# Patient Record
Sex: MALE | Race: WHITE
[De-identification: names, ages, dates, MRNs, and addresses within clinical notes are randomized per-mention and may not be internally consistent; named-entity substitution may affect disease eponyms.]

---

## 2020-11-05 ENCOUNTER — HOSPITAL ENCOUNTER (OUTPATIENT)
Dept: HOSPITAL 96 - M.PUL | Age: 50
End: 2020-11-05
Attending: NURSE PRACTITIONER
Payer: COMMERCIAL

## 2020-11-05 DIAGNOSIS — R05: Primary | ICD-10-CM

## 2020-11-16 NOTE — PF
[address]  [address]                    PULMONARY FUNCTION REPORT     
_______________________________________________________________________________
 
Name:       LAXMI CABRALES              Room:                      REG CLI 
M.SUJIT#:  [id number]      Account #:      [id number]  
Admission:  11/05/20     Attend Phys:    YENNY CORTÉS
Discharge:               Date of Birth:  [birth year]  
         Report #: [id number]
                                                                     [id number]  
_______________________________________________________________________________
THIS REPORT FOR:  //name//                      
 
CC: YENNY WREN
 
DATE OF SERVICE:  11/05/2020
 
 
STUDY:  The FEV1/FVC ratio is normal at 72% with a forced vital capacity normal
at 91% and FEV1 normal at 84%.  The TPE11-57 is also normal at 72%.  After the
administration of a bronchodilator, there is no significant increase in any of
these values.  The patient's post-bronchodilator FEV1 is noted to be 3.60
liters.  The total lung capacity is normal at 100% and residual volume is
normal, but close to the upper limit of normal range at 114%.  The DLCO as
adjusted for hemoglobin is normal at 96%.
 
IMPRESSION:  These are normal pulmonary function tests.  The residual volume is
noted to be within the normal range; however, close to the upper limit of normal
range.  This could be a normal variant or could indicate mild underlying
obstructive lung disease.  Clinical correlation is advised.
 
 
 
 
 
 
 
 
 
 
 
 
 
 
 
 
 
 
 
 
 
 
 
 
 
<ELECTRONICALLY SIGNED>
                                        By:  Leonidas De Jesus MD              
11/16/20     0759
D: 11/16/20 0726_______________________________________
T: 11/16/20 0735MD irene Mercado

## 2021-11-09 ENCOUNTER — HOSPITAL ENCOUNTER (OUTPATIENT)
Dept: HOSPITAL 96 - M.CT | Age: 51
End: 2021-11-09
Attending: NURSE PRACTITIONER
Payer: COMMERCIAL

## 2021-11-09 DIAGNOSIS — Z13.6: Primary | ICD-10-CM

## 2021-12-07 ENCOUNTER — HOSPITAL ENCOUNTER (INPATIENT)
Dept: HOSPITAL 96 - M.ERS | Age: 51
LOS: 9 days | Discharge: HOME HEALTH | DRG: 871 | End: 2021-12-16
Attending: STUDENT IN AN ORGANIZED HEALTH CARE EDUCATION/TRAINING PROGRAM | Admitting: STUDENT IN AN ORGANIZED HEALTH CARE EDUCATION/TRAINING PROGRAM
Payer: COMMERCIAL

## 2021-12-07 VITALS — DIASTOLIC BLOOD PRESSURE: 65 MMHG | SYSTOLIC BLOOD PRESSURE: 115 MMHG

## 2021-12-07 VITALS — DIASTOLIC BLOOD PRESSURE: 81 MMHG | SYSTOLIC BLOOD PRESSURE: 124 MMHG

## 2021-12-07 VITALS — DIASTOLIC BLOOD PRESSURE: 66 MMHG | SYSTOLIC BLOOD PRESSURE: 121 MMHG

## 2021-12-07 VITALS — DIASTOLIC BLOOD PRESSURE: 62 MMHG | SYSTOLIC BLOOD PRESSURE: 110 MMHG

## 2021-12-07 VITALS — WEIGHT: 232 LBS | BODY MASS INDEX: 32.48 KG/M2 | HEIGHT: 71 IN

## 2021-12-07 VITALS — SYSTOLIC BLOOD PRESSURE: 100 MMHG | DIASTOLIC BLOOD PRESSURE: 59 MMHG

## 2021-12-07 DIAGNOSIS — E87.70: ICD-10-CM

## 2021-12-07 DIAGNOSIS — J96.01: ICD-10-CM

## 2021-12-07 DIAGNOSIS — G47.33: ICD-10-CM

## 2021-12-07 DIAGNOSIS — Z86.16: ICD-10-CM

## 2021-12-07 DIAGNOSIS — A41.89: Primary | ICD-10-CM

## 2021-12-07 DIAGNOSIS — E11.65: ICD-10-CM

## 2021-12-07 DIAGNOSIS — J12.82: ICD-10-CM

## 2021-12-07 DIAGNOSIS — F41.9: ICD-10-CM

## 2021-12-07 DIAGNOSIS — E66.9: ICD-10-CM

## 2021-12-07 DIAGNOSIS — U07.1: ICD-10-CM

## 2021-12-07 DIAGNOSIS — Z87.891: ICD-10-CM

## 2021-12-07 LAB
ABSOLUTE BASOPHILS: 0 THOU/UL (ref 0–0.2)
ABSOLUTE EOSINOPHILS: 0 THOU/UL (ref 0–0.7)
ABSOLUTE MONOCYTES: 0.2 THOU/UL (ref 0–1.2)
ALBUMIN SERPL-MCNC: 2.9 G/DL (ref 3.4–5)
ALP SERPL-CCNC: 52 U/L (ref 46–116)
ALT SERPL-CCNC: 60 U/L (ref 30–65)
ANION GAP SERPL CALC-SCNC: 11 MMOL/L (ref 7–16)
AST SERPL-CCNC: 87 U/L (ref 15–37)
BASOPHILS NFR BLD AUTO: 0.3 %
BILIRUB SERPL-MCNC: 0.7 MG/DL
BUN SERPL-MCNC: 17 MG/DL (ref 7–18)
CALCIUM SERPL-MCNC: 8.3 MG/DL (ref 8.5–10.1)
CHLORIDE SERPL-SCNC: 95 MMOL/L (ref 98–107)
CO2 SERPL-SCNC: 25 MMOL/L (ref 21–32)
CREAT SERPL-MCNC: 1.4 MG/DL (ref 0.6–1.3)
EOSINOPHIL NFR BLD: 0 %
GLUCOSE SERPL-MCNC: 156 MG/DL (ref 70–99)
GRANULOCYTES NFR BLD MANUAL: 84.2 %
HCT VFR BLD CALC: 42.1 % (ref 42–52)
HGB BLD-MCNC: 14.3 GM/DL (ref 14–18)
LYMPHOCYTES # BLD: 1.3 THOU/UL (ref 0.8–5.3)
LYMPHOCYTES NFR BLD AUTO: 13.2 %
MCH RBC QN AUTO: 29.1 PG (ref 26–34)
MCHC RBC AUTO-ENTMCNC: 33.9 G/DL (ref 28–37)
MCV RBC: 85.9 FL (ref 80–100)
MONOCYTES NFR BLD: 2.3 %
MPV: 7.9 FL. (ref 7.2–11.1)
NEUTROPHILS # BLD: 8.2 THOU/UL (ref 1.6–8.1)
NT-PRO BRAIN NAT PEPTIDE: 89 PG/ML (ref ?–300)
NUCLEATED RBCS: 0 /100WBC
PLATELET COUNT*: 193 THOU/UL (ref 150–400)
POTASSIUM SERPL-SCNC: 4.1 MMOL/L (ref 3.5–5.1)
PROT SERPL-MCNC: 7.6 G/DL (ref 6.4–8.2)
RBC # BLD AUTO: 4.9 MIL/UL (ref 4.5–6)
RDW-CV: 14.9 % (ref 10.5–14.5)
SODIUM SERPL-SCNC: 131 MMOL/L (ref 136–145)
WBC # BLD AUTO: 9.8 THOU/UL (ref 4–11)

## 2021-12-07 NOTE — NUR
Infection Control:  Per [facility] patient has a
positive Covid PCR test on 12/1/21 and 12/3/21.

## 2021-12-08 VITALS — SYSTOLIC BLOOD PRESSURE: 156 MMHG | DIASTOLIC BLOOD PRESSURE: 92 MMHG

## 2021-12-08 VITALS — SYSTOLIC BLOOD PRESSURE: 131 MMHG | DIASTOLIC BLOOD PRESSURE: 71 MMHG

## 2021-12-08 VITALS — SYSTOLIC BLOOD PRESSURE: 115 MMHG | DIASTOLIC BLOOD PRESSURE: 75 MMHG

## 2021-12-08 VITALS — SYSTOLIC BLOOD PRESSURE: 124 MMHG | DIASTOLIC BLOOD PRESSURE: 72 MMHG

## 2021-12-08 VITALS — SYSTOLIC BLOOD PRESSURE: 123 MMHG | DIASTOLIC BLOOD PRESSURE: 79 MMHG

## 2021-12-08 VITALS — SYSTOLIC BLOOD PRESSURE: 145 MMHG | DIASTOLIC BLOOD PRESSURE: 81 MMHG

## 2021-12-08 VITALS — DIASTOLIC BLOOD PRESSURE: 69 MMHG | SYSTOLIC BLOOD PRESSURE: 116 MMHG

## 2021-12-08 VITALS — SYSTOLIC BLOOD PRESSURE: 139 MMHG | DIASTOLIC BLOOD PRESSURE: 82 MMHG

## 2021-12-08 VITALS — DIASTOLIC BLOOD PRESSURE: 83 MMHG | SYSTOLIC BLOOD PRESSURE: 131 MMHG

## 2021-12-08 LAB
ABSOLUTE MONOCYTES: 0.2 THOU/UL (ref 0–1.2)
ALBUMIN SERPL-MCNC: 2.5 G/DL (ref 3.4–5)
ALP SERPL-CCNC: 50 U/L (ref 46–116)
ALT SERPL-CCNC: 65 U/L (ref 30–65)
ANION GAP SERPL CALC-SCNC: 9 MMOL/L (ref 7–16)
APTT BLD: 30 SECONDS (ref 25–31.3)
AST SERPL-CCNC: 72 U/L (ref 15–37)
BE: -2 MMOL/L
BE: 0.3 MMOL/L
BILIRUB SERPL-MCNC: 0.4 MG/DL
BUN SERPL-MCNC: 19 MG/DL (ref 7–18)
CALCIUM SERPL-MCNC: 8.2 MG/DL (ref 8.5–10.1)
CHLORIDE SERPL-SCNC: 99 MMOL/L (ref 98–107)
CO2 SERPL-SCNC: 26 MMOL/L (ref 21–32)
CREAT SERPL-MCNC: 1.3 MG/DL (ref 0.6–1.3)
GLUCOSE SERPL-MCNC: 183 MG/DL (ref 70–99)
GRANULOCYTES NFR BLD MANUAL: 81 %
HCT VFR BLD CALC: 40.1 % (ref 42–52)
HGB BLD-MCNC: 13.3 GM/DL (ref 14–18)
INR PPP: 1
LYMPHOCYTES # BLD: 1.6 THOU/UL (ref 0.8–5.3)
LYMPHOCYTES NFR BLD AUTO: 13 %
MAGNESIUM SERPL-MCNC: 2.2 MG/DL (ref 1.8–2.4)
MCH RBC QN AUTO: 28.6 PG (ref 26–34)
MCHC RBC AUTO-ENTMCNC: 33.2 G/DL (ref 28–37)
MCV RBC: 86 FL (ref 80–100)
MONOCYTES NFR BLD: 2 %
MPV: 7.5 FL. (ref 7.2–11.1)
MYELOCYTES NFR BLD: 1 %
NEUTROPHILS # BLD: 10.5 THOU/UL (ref 1.6–8.1)
NEUTS BAND NFR BLD: 3 %
NUCLEATED RBCS: 0 /100WBC
PCO2 BLD: 32 MMHG (ref 35–45)
PCO2 BLD: 38.5 MMHG (ref 35–45)
PHOSPHATE SERPL-MCNC: 3 MG/DL (ref 2.5–4.9)
PLATELET # BLD EST: ADEQUATE 10*3/UL
PLATELET COUNT*: 234 THOU/UL (ref 150–400)
PO2 BLD: 68.4 MMHG (ref 75–100)
PO2 BLD: 71.6 MMHG (ref 75–100)
POTASSIUM SERPL-SCNC: 3.5 MMOL/L (ref 3.5–5.1)
PROT SERPL-MCNC: 7.2 G/DL (ref 6.4–8.2)
PROTHROMBIN TIME: 10.5 SECONDS (ref 9.2–11.5)
RBC # BLD AUTO: 4.67 MIL/UL (ref 4.5–6)
RBC MORPH BLD: NORMAL
RDW-CV: 15.2 % (ref 10.5–14.5)
SODIUM SERPL-SCNC: 134 MMOL/L (ref 136–145)
WBC # BLD AUTO: 12.3 THOU/UL (ref 4–11)

## 2021-12-08 NOTE — NUR
Pt is admitted on 12/07/21 with Covid. Called wife - Evelina at: [phone]
to complete assessment. Pt lives with wife in a [place] house. Wife
reports 15 steps to enter. Wife reports patient was independent in ambulation
and ADL's.  No hx of DME, HH, or SNF. 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
Wife reports patient last saw his PCP last August or September of 2021.  CM to
follow for HH or DME needs.

## 2021-12-08 NOTE — EKG
[place] [address]
Phone:  [phone]                     ELECTROCARDIOGRAM REPORT      
_______________________________________________________________________________
 
Name:         LAXMI CABRALES             Room:          [place] [address]   ADM [place] 
..#:    [id number]     Account #:     [id number]  
Admission:    21    Attend Phys:   iNcole Herron
Discharge:                Date of Birth: [birth year]  
Date of Service: 21 0934  Report #:      [id number]
        [id number]
_______________________________________________________________________________
THIS REPORT FOR:  //name//                      
 
                         [facility] ED
                                       
Test Date:    2021               Test Time:    09:34:10
Pat Name:     LAXMI CABRALES          Department:   
Patient ID:   [id number]            Room:         [facility]
Gender:       M                        Technician:   NIKHIL
:          1970               Requested By: Malik Soto
Order Number: [id number] MD:   Chet Cesar
                                 Measurements
Intervals                              Axis          
Rate:         100                      P:            66
TX:           153                      QRS:          60
QRSD:         71                       T:            29
QT:           373                                    
QTc:          482                                    
                           Interpretive Statements
Sinus tachycardia
Borderline prolonged QT interval
Baseline wander in lead(s) V1
Compared to ECG 2010 18:52:23
Sinus rhythm no longer present
Electronically Signed On 2021 8:55:29 CST by Chet Cesar
[email or web address]
 
 
 
 
 
 
 
 
 
 
 
 
 
 
 
 
 
 
 
  <ELECTRONICALLY SIGNED>
                                           By: Chet Cesar MD, [facility]      
  21     0855
D: 2134   _____________________________________
T: 2134   Chet Cesar MD, [facility]        /EPI

## 2021-12-09 VITALS — DIASTOLIC BLOOD PRESSURE: 71 MMHG | SYSTOLIC BLOOD PRESSURE: 109 MMHG

## 2021-12-09 VITALS — DIASTOLIC BLOOD PRESSURE: 69 MMHG | SYSTOLIC BLOOD PRESSURE: 121 MMHG

## 2021-12-09 VITALS — DIASTOLIC BLOOD PRESSURE: 55 MMHG | SYSTOLIC BLOOD PRESSURE: 107 MMHG

## 2021-12-09 VITALS — DIASTOLIC BLOOD PRESSURE: 71 MMHG | SYSTOLIC BLOOD PRESSURE: 123 MMHG

## 2021-12-09 VITALS — DIASTOLIC BLOOD PRESSURE: 68 MMHG | SYSTOLIC BLOOD PRESSURE: 119 MMHG

## 2021-12-09 VITALS — DIASTOLIC BLOOD PRESSURE: 68 MMHG | SYSTOLIC BLOOD PRESSURE: 130 MMHG

## 2021-12-09 LAB
ANION GAP SERPL CALC-SCNC: 13 MMOL/L (ref 7–16)
BUN SERPL-MCNC: 23 MG/DL (ref 7–18)
CALCIUM SERPL-MCNC: 8.3 MG/DL (ref 8.5–10.1)
CHLORIDE SERPL-SCNC: 100 MMOL/L (ref 98–107)
CO2 SERPL-SCNC: 25 MMOL/L (ref 21–32)
CREAT SERPL-MCNC: 1.2 MG/DL (ref 0.6–1.3)
GLUCOSE SERPL-MCNC: 152 MG/DL (ref 70–99)
POTASSIUM SERPL-SCNC: 3.6 MMOL/L (ref 3.5–5.1)
SODIUM SERPL-SCNC: 138 MMOL/L (ref 136–145)

## 2021-12-09 NOTE — NUR
Nutrition: Pt admitted with COVID. Meds: Remdesivir, ABX, steroids, plasma,
vitamin C. Regular diet ordered. Labs: , albumin 2.5, prealb 10.2. Wt:
323#. Consult was received for poor appetite. Attempted to call RN who was
busy. No meal intake % is recorded in [email or web address]. RD will order Ensure pudding
for added kcals and protein. Please record wts and meal intake. At this time,
consider mild nutrition risk. Will follow up per protocol.

## 2021-12-09 NOTE — NUR
CM FOLLOWUP
 
PT NOT MEDICALLY CLEAR YET. PT DX WITH COVID AND ON 15L. WHEN MEDICALLY CLEAR,
PLAN FOR PT TO DC HOME. CM TO FOLLOW FOR DC NEEDS.

## 2021-12-09 NOTE — NUR
Pt alert and oriented; pleasant and cooperative though fatigued.  SOA with
activity, and desats into mid-80s with activity.  Also becomes tachypneic and
SOA.  Pt placed on BIPAP following getting up to BSC and back to bed, and
again after getting up to recliner this evening.  VSS.  On 15L O2 per [facility] when
off BIPAP.  Will continue to monitor.

## 2021-12-10 VITALS — DIASTOLIC BLOOD PRESSURE: 77 MMHG | SYSTOLIC BLOOD PRESSURE: 125 MMHG

## 2021-12-10 VITALS — SYSTOLIC BLOOD PRESSURE: 112 MMHG | DIASTOLIC BLOOD PRESSURE: 65 MMHG

## 2021-12-10 VITALS — SYSTOLIC BLOOD PRESSURE: 109 MMHG | DIASTOLIC BLOOD PRESSURE: 54 MMHG

## 2021-12-10 VITALS — SYSTOLIC BLOOD PRESSURE: 129 MMHG | DIASTOLIC BLOOD PRESSURE: 68 MMHG

## 2021-12-10 VITALS — DIASTOLIC BLOOD PRESSURE: 68 MMHG | SYSTOLIC BLOOD PRESSURE: 123 MMHG

## 2021-12-10 VITALS — SYSTOLIC BLOOD PRESSURE: 118 MMHG | DIASTOLIC BLOOD PRESSURE: 56 MMHG

## 2021-12-10 LAB
ANION GAP SERPL CALC-SCNC: 7 MMOL/L (ref 7–16)
BUN SERPL-MCNC: 27 MG/DL (ref 7–18)
CALCIUM SERPL-MCNC: 8.3 MG/DL (ref 8.5–10.1)
CHLORIDE SERPL-SCNC: 102 MMOL/L (ref 98–107)
CO2 SERPL-SCNC: 29 MMOL/L (ref 21–32)
CREAT SERPL-MCNC: 1.1 MG/DL (ref 0.6–1.3)
GLUCOSE SERPL-MCNC: 188 MG/DL (ref 70–99)
HCT VFR BLD CALC: 39.6 % (ref 42–52)
HGB BLD-MCNC: 13.2 GM/DL (ref 14–18)
MCH RBC QN AUTO: 28.9 PG (ref 26–34)
MCHC RBC AUTO-ENTMCNC: 33.3 G/DL (ref 28–37)
MCV RBC: 86.9 FL (ref 80–100)
MPV: 7.8 FL. (ref 7.2–11.1)
PLATELET COUNT*: 342 THOU/UL (ref 150–400)
POTASSIUM SERPL-SCNC: 3.5 MMOL/L (ref 3.5–5.1)
RBC # BLD AUTO: 4.56 MIL/UL (ref 4.5–6)
RDW-CV: 15.4 % (ref 10.5–14.5)
SODIUM SERPL-SCNC: 138 MMOL/L (ref 136–145)
WBC # BLD AUTO: 9.9 THOU/UL (ref 4–11)

## 2021-12-10 NOTE — 2DMMODE
[place] [address]
Phone:  [phone] 2 D/M-MODE ECHOCARDIOGRAM     
_______________________________________________________________________________
 
Name:         LAXMI CABRALES             Room:          [address] [place] 
.SHIV.#:    [id number]     Account #:     [id number]  
Admission:    21    Attend Phys:   Nicole Herron
Discharge:                Date of Birth: [birth year]  
Date of Service: 12/10/21 1417  Report #:      [id number]
        [id number]
_______________________________________________________________________________
THIS REPORT FOR:
 
cc:  YENNY WREN NP, KATHERINE NP Blick, David R. MD [facility]        
                                                                       ~
 
--------------- APPROVED REPORT --------------
 
 
Study performed:  12/10/2021 11:29:01
 
EXAM: Comprehensive 2D, Doppler, and color-flow 
Echocardiogram 
Patient Location: In-Patient   
Room #:  [facility]     Status:  routine
 
      BSA:         2.25
HR: 60 bpm BP:          112/65 mmHg 
Rhythm: NSR     
 
Other Information 
Study Quality: Good
 
Indications
Dyspnea 
 
2D Dimensions
IVSd:  11.48 (7-11mm) LVOT Diam:  20.57 (18-24mm) 
LVDd:  50.84 mm  
PWd:  10.29 (7-11mm) Ascending Ao:  35.50 (22-36mm)
LVDs:  24.61 (25-40mm) 
Aortic Root:  29.53 mm 
 
Volumes
Left Atrial Volume (Systole) 
    LA ESV Index:  24.90 mL/m2
 
Aortic Valve
AoV Peak Jeramie.:  1.55 m/s 
AO Peak Gr.:  9.56 mmHg  LVOT Max P.28 mmHg
AO Mean Gr.:  4.64 mmHg  LVOT Mean PG:  3.24 mmHg
    LVOT Max V:  1.35 m/s
AO V2 VTI:  27.68 cm  LVOT Mean V:  0.82 m/s
HUGH (VTI):  3.30 cm2  LVOT V1 VTI:  27.51 cm
 
 
 
[place] [address]
Phone:  [phone]                     2 D/M-MODE ECHOCARDIOGRAM     
_______________________________________________________________________________
 
Name:         LAXMI CABRALES             Room:          [address] [place] 
..#:    [id number]     Account #:     [id number]  
Admission:    21    Attend Phys:   Nicole Herron
Discharge:                Date of Birth: [birth year]  
Date of Service: 12/10/21 1417  Report #:      [id number]
        [id number]
_______________________________________________________________________________
Mitral Valve
    E/A Ratio:  1.46
    MV Decel. Time:  229.99 ms
MV E Max Jeramie.:  0.83 m/s 
MV PHT:  66.70 ms  
MVA (PHT):  3.30 cm2  
 
TDI
E/Lateral E':  6.38 E/Medial E':  5.93
   Medial E' Jeramie.:  0.14 m/s
   Lateral E' Jeramie.:  0.13 m/s
 
Pulmonary Valve
PV Peak Jeramie.:  1.23 m/s PV Peak Gr.:  6.01 mmHg
 
Tricuspid Valve
    RAP Estimate:  5.00 mmHg
TR Peak Gr.:  32.72 mmHg RVSP:  37.00 mmHg
    PA Pressure:  37.00 mmHg
 
Left Ventricle
The left ventricle is normal size. There is normal LV segmental wall 
motion. There is normal left ventricular wall thickness. Left 
ventricular systolic function is normal. The left ventricular 
ejection fraction is within the normal range. LVEF is 60-65%. The 
left ventricular diastolic function is normal.
 
Right Ventricle
The right ventricle is normal size. The right ventricular systolic 
function is normal.
 
Atria
The left atrium size is normal. The right atrium size is 
normal.
 
Aortic Valve
The aortic valve is normal in structure. No aortic regurgitation is 
present. There is no aortic valvular stenosis.
 
Mitral Valve
The mitral valve is normal in structure. Trace mitral regurgitation. 
No evidence of mitral valve stenosis.
 
Tricuspid Valve
The tricuspid valve is normal in structure. Trace tricuspid 
regurgitation. estimated pa pressure 35 mm Hg
 
 
[place] [address]
Phone:  [phone]                     2 D/M-MODE ECHOCARDIOGRAM     
_______________________________________________________________________________
 
Name:         LAXMI CABRALES             Room:          [address] [place] 
[facility]#:    [id number]     Account #:     [id number]  
Admission:    21    Attend Phys:   Nicole Herron
Discharge:                Date of Birth: [birth year]  
Date of Service: 12/10/21 1417  Report #:      [id number]
        [id number]
_______________________________________________________________________________
 
Pulmonic Valve
The pulmonary valve is normal in structure. Trace pulmonic 
regurgitation.
 
Great Vessels
The aortic root is normal in size. IVC is not well 
visualized.
 
Pericardium
There is no pericardial effusion.
 
<Conclusion>
LVEF is 60-65%.
Trace tricuspid regurgitation.
estimated pa pressure 35 mm Hg
 
 
 
 
 
 
 
 
 
 
 
 
 
 
 
 
 
 
 
 
 
 
 
 
 
 
 
 
  <ELECTRONICALLY SIGNED>
                                           By: Chet Cesar MD, FACC      
  12/10/21     1417
D: 12/10/21 1417   _____________________________________
T: 12/10/21 1417   Chet Cesar MD, FACC        /INF

## 2021-12-10 NOTE — NUR
CM FOLLOWUP
 
PT NOT MED CLEAR AND IS STAYING THROUGH WEEKEND. UPON MED CLEARANCE, PT TO DC
HOME. NO CM NEEDS NOTED.

## 2021-12-11 VITALS — DIASTOLIC BLOOD PRESSURE: 79 MMHG | SYSTOLIC BLOOD PRESSURE: 123 MMHG

## 2021-12-11 VITALS — SYSTOLIC BLOOD PRESSURE: 112 MMHG | DIASTOLIC BLOOD PRESSURE: 59 MMHG

## 2021-12-11 VITALS — SYSTOLIC BLOOD PRESSURE: 102 MMHG | DIASTOLIC BLOOD PRESSURE: 56 MMHG

## 2021-12-11 VITALS — SYSTOLIC BLOOD PRESSURE: 119 MMHG | DIASTOLIC BLOOD PRESSURE: 66 MMHG

## 2021-12-11 VITALS — DIASTOLIC BLOOD PRESSURE: 72 MMHG | SYSTOLIC BLOOD PRESSURE: 116 MMHG

## 2021-12-11 VITALS — DIASTOLIC BLOOD PRESSURE: 72 MMHG | SYSTOLIC BLOOD PRESSURE: 137 MMHG

## 2021-12-11 LAB
ANION GAP SERPL CALC-SCNC: 8 MMOL/L (ref 7–16)
BUN SERPL-MCNC: 29 MG/DL (ref 7–18)
CALCIUM SERPL-MCNC: 8.5 MG/DL (ref 8.5–10.1)
CHLORIDE SERPL-SCNC: 103 MMOL/L (ref 98–107)
CO2 SERPL-SCNC: 26 MMOL/L (ref 21–32)
CREAT SERPL-MCNC: 1.2 MG/DL (ref 0.6–1.3)
GLUCOSE SERPL-MCNC: 187 MG/DL (ref 70–99)
HCT VFR BLD CALC: 39.3 % (ref 42–52)
HGB BLD-MCNC: 13 GM/DL (ref 14–18)
MCH RBC QN AUTO: 28.7 PG (ref 26–34)
MCHC RBC AUTO-ENTMCNC: 33.2 G/DL (ref 28–37)
MCV RBC: 86.5 FL (ref 80–100)
MPV: 7.4 FL. (ref 7.2–11.1)
PLATELET COUNT*: 403 THOU/UL (ref 150–400)
POTASSIUM SERPL-SCNC: 3.7 MMOL/L (ref 3.5–5.1)
RBC # BLD AUTO: 4.55 MIL/UL (ref 4.5–6)
RDW-CV: 15.4 % (ref 10.5–14.5)
SODIUM SERPL-SCNC: 137 MMOL/L (ref 136–145)
WBC # BLD AUTO: 11 THOU/UL (ref 4–11)

## 2021-12-12 VITALS — SYSTOLIC BLOOD PRESSURE: 115 MMHG | DIASTOLIC BLOOD PRESSURE: 56 MMHG

## 2021-12-12 VITALS — DIASTOLIC BLOOD PRESSURE: 78 MMHG | SYSTOLIC BLOOD PRESSURE: 136 MMHG

## 2021-12-12 VITALS — SYSTOLIC BLOOD PRESSURE: 123 MMHG | DIASTOLIC BLOOD PRESSURE: 85 MMHG

## 2021-12-12 VITALS — SYSTOLIC BLOOD PRESSURE: 128 MMHG | DIASTOLIC BLOOD PRESSURE: 83 MMHG

## 2021-12-12 VITALS — DIASTOLIC BLOOD PRESSURE: 85 MMHG | SYSTOLIC BLOOD PRESSURE: 121 MMHG

## 2021-12-12 VITALS — SYSTOLIC BLOOD PRESSURE: 128 MMHG | DIASTOLIC BLOOD PRESSURE: 54 MMHG

## 2021-12-12 LAB
%HYPO/RBC NFR BLD AUTO: (no result) %
ABSOLUTE MONOCYTES: 0.5 THOU/UL (ref 0–1.2)
ALBUMIN SERPL-MCNC: 2.6 G/DL (ref 3.4–5)
ALP SERPL-CCNC: 56 U/L (ref 46–116)
ALT SERPL-CCNC: 111 U/L (ref 30–65)
ANION GAP SERPL CALC-SCNC: 9 MMOL/L (ref 7–16)
AST SERPL-CCNC: 61 U/L (ref 15–37)
BILIRUB SERPL-MCNC: 0.4 MG/DL
BUN SERPL-MCNC: 33 MG/DL (ref 7–18)
CALCIUM SERPL-MCNC: 8.4 MG/DL (ref 8.5–10.1)
CHLORIDE SERPL-SCNC: 101 MMOL/L (ref 98–107)
CO2 SERPL-SCNC: 27 MMOL/L (ref 21–32)
CREAT SERPL-MCNC: 1.3 MG/DL (ref 0.6–1.3)
GLUCOSE SERPL-MCNC: 209 MG/DL (ref 70–99)
GRANULOCYTES NFR BLD MANUAL: 89 %
HCT VFR BLD CALC: 38.8 % (ref 42–52)
HGB BLD-MCNC: 12.9 GM/DL (ref 14–18)
LYMPHOCYTES # BLD: 0.5 THOU/UL (ref 0.8–5.3)
LYMPHOCYTES NFR BLD AUTO: 5 %
MAGNESIUM SERPL-MCNC: 2.4 MG/DL (ref 1.8–2.4)
MCH RBC QN AUTO: 28.8 PG (ref 26–34)
MCHC RBC AUTO-ENTMCNC: 33.2 G/DL (ref 28–37)
MCV RBC: 86.7 FL (ref 80–100)
METAMYELOCYTES NFR BLD: 1 %
MONOCYTES NFR BLD: 5 %
MPV: 7.6 FL. (ref 7.2–11.1)
NEUTROPHILS # BLD: 9.5 THOU/UL (ref 1.6–8.1)
NUCLEATED RBCS: 0 /100WBC
PLATELET # BLD EST: ADEQUATE 10*3/UL
PLATELET COUNT*: 438 THOU/UL (ref 150–400)
POTASSIUM SERPL-SCNC: 3.8 MMOL/L (ref 3.5–5.1)
PROT SERPL-MCNC: 6.7 G/DL (ref 6.4–8.2)
RBC # BLD AUTO: 4.48 MIL/UL (ref 4.5–6)
RDW-CV: 15.2 % (ref 10.5–14.5)
SODIUM SERPL-SCNC: 137 MMOL/L (ref 136–145)
WBC # BLD AUTO: 10.6 THOU/UL (ref 4–11)

## 2021-12-13 VITALS — DIASTOLIC BLOOD PRESSURE: 54 MMHG | SYSTOLIC BLOOD PRESSURE: 120 MMHG

## 2021-12-13 VITALS — SYSTOLIC BLOOD PRESSURE: 118 MMHG | DIASTOLIC BLOOD PRESSURE: 56 MMHG

## 2021-12-13 VITALS — SYSTOLIC BLOOD PRESSURE: 135 MMHG | DIASTOLIC BLOOD PRESSURE: 72 MMHG

## 2021-12-13 VITALS — SYSTOLIC BLOOD PRESSURE: 109 MMHG | DIASTOLIC BLOOD PRESSURE: 70 MMHG

## 2021-12-13 VITALS — SYSTOLIC BLOOD PRESSURE: 121 MMHG | DIASTOLIC BLOOD PRESSURE: 67 MMHG

## 2021-12-13 LAB
ABSOLUTE BASOPHILS: 0 THOU/UL (ref 0–0.2)
ABSOLUTE EOSINOPHILS: 0 THOU/UL (ref 0–0.7)
ABSOLUTE MONOCYTES: 0.7 THOU/UL (ref 0–1.2)
ALBUMIN SERPL-MCNC: 2.7 G/DL (ref 3.4–5)
ALP SERPL-CCNC: 64 U/L (ref 46–116)
ALT SERPL-CCNC: 154 U/L (ref 30–65)
ANION GAP SERPL CALC-SCNC: 12 MMOL/L (ref 7–16)
AST SERPL-CCNC: 81 U/L (ref 15–37)
BASOPHILS NFR BLD AUTO: 0.1 %
BILIRUB SERPL-MCNC: 0.4 MG/DL
BUN SERPL-MCNC: 31 MG/DL (ref 7–18)
CALCIUM SERPL-MCNC: 8.6 MG/DL (ref 8.5–10.1)
CHLORIDE SERPL-SCNC: 101 MMOL/L (ref 98–107)
CO2 SERPL-SCNC: 24 MMOL/L (ref 21–32)
CREAT SERPL-MCNC: 1.3 MG/DL (ref 0.6–1.3)
EOSINOPHIL NFR BLD: 0 %
GLUCOSE SERPL-MCNC: 250 MG/DL (ref 70–99)
GRANULOCYTES NFR BLD MANUAL: 88.5 %
HCT VFR BLD CALC: 41.1 % (ref 42–52)
HGB BLD-MCNC: 13.6 GM/DL (ref 14–18)
LYMPHOCYTES # BLD: 0.7 THOU/UL (ref 0.8–5.3)
LYMPHOCYTES NFR BLD AUTO: 5.5 %
MAGNESIUM SERPL-MCNC: 2.5 MG/DL (ref 1.8–2.4)
MCH RBC QN AUTO: 28.9 PG (ref 26–34)
MCHC RBC AUTO-ENTMCNC: 33.1 G/DL (ref 28–37)
MCV RBC: 87.1 FL (ref 80–100)
MONOCYTES NFR BLD: 5.9 %
MPV: 7.9 FL. (ref 7.2–11.1)
NEUTROPHILS # BLD: 11.1 THOU/UL (ref 1.6–8.1)
NUCLEATED RBCS: 0 /100WBC
PLATELET COUNT*: 474 THOU/UL (ref 150–400)
POTASSIUM SERPL-SCNC: 3.9 MMOL/L (ref 3.5–5.1)
PROT SERPL-MCNC: 6.9 G/DL (ref 6.4–8.2)
RBC # BLD AUTO: 4.72 MIL/UL (ref 4.5–6)
RDW-CV: 15.1 % (ref 10.5–14.5)
SODIUM SERPL-SCNC: 137 MMOL/L (ref 136–145)
WBC # BLD AUTO: 12.5 THOU/UL (ref 4–11)

## 2021-12-13 NOTE — CON
[facility] 
[address] [place]  [address]                    CONSULTATION                  
_______________________________________________________________________________
 
Name:       LAXMI CABRALES SUZANNE              Room:           [address] [place]  
M.R.#:  [id number]      Account #:      [id number]  
Admission:  12/07/21     Attend Phys:    NEAL Huizar
Discharge:               Date of Birth:  [birth year]  
         Report #: [id number]
                                                                     [id number]
_______________________________________________________________________________
THIS REPORT FOR:  
 
cc:  YENNY WREN NP, KATHERINE NP Pervez, Adeel MD                                                   ~
 
 
DATE OF CONSULTATION: 12/09/2021
 
REQUESTING PHYSICIAN:  Dr. Herron.
 
INDICATION FOR CONSULTATION:  Acute hypoxemic respiratory failure secondary to 
COVID-19.
 
HISTORY OF PRESENT ILLNESS:  This is a 51-year-old gentleman.  He has a history 
of obstructive sleep apnea.  His body mass index is elevated to 32.4.  He does 
use a CPAP at home.  He otherwise does not have a history of a cardiac or 
respiratory disease.  He has a remote history of smoking, discontinued more than
20 years ago.  He has not been vaccinated for COVID-19.
 
The patient is now admitted with COVID-19.  He had symptoms for about 6 days 
prior to admission.  He was admitted here 2 days ago.  He reported increasing 
shortness of breath.  He has had a cough.  There is not much sputum.  He has had
fever, chills, loss of taste and poor appetite.  He is not vaccinated for 
COVID-19.  There has been worsening in his respiratory status since admission.  
He is currently requiring 15 liters of oxygen to maintain O2 saturation, barely 
at 90%.  The patient upon presentation was also hypoxemic, but at that point, he
was maintaining O2 saturation with 6 liters of oxygen.  He does have some 
swelling of lower extremities.  He has had disturbed sleep at night as well as 
with sleepiness during the day with symptoms significantly improved with the use
of CPAP at home.
 
REVIEW OF SYSTEMS:  A 12 points is negative except as mentioned above.
 
PAST MEDICAL HISTORY:  Obstructive sleep apnea, on a CPAP at home, obesity, body
mass index 32.4.
 
SOCIAL HISTORY:  There is a remote history of smoking.  He says he discontinued 
smoking more than 20 years ago.  No known history of heavy alcohol use or 
illegal drug use.
 
CURRENT MEDICATIONS:  List in [employer] reviewed.
 
HOME MEDICATIONS:  List in [employer] reviewed.
 
ALLERGIES:  No known drug allergies.
 
 
 
[place] [address]                    CONSULTATION                  
_______________________________________________________________________________
 
Name:       JO CABRALESYCE SUZANNE              Room:           [address] [place]  
[facility]#:  [id number]      Account #:      [id number]  
Admission:  12/07/21     Attend Phys:    NEAL Huizar
Discharge:               Date of Birth:  [birth year]  
         Report #: [id number]
                                                                     [id number]
_______________________________________________________________________________
 
 
 
IMMUNIZATION HISTORY:  Not vaccinated for COVID-19.
 
PHYSICAL EXAMINATION:
GENERAL:  He is alert, awake and oriented.  He is sitting in a chair.
VITAL SIGNS:  Has a pulse of 67 and a blood pressure of 121/69, he is saturating
90%.  He is on 15 liters nasal cannula.  His respiratory rate is elevated to 24.
 He is afebrile with a temperature of 36.3.
HEENT:  Head is normocephalic and atraumatic.
NECK:  Does not show raised JVP.
CHEST:  Breath sounds are bilaterally equal, decreased.  No added sounds.
HEART:  Regular.  There is no murmur.
ABDOMEN:  Soft and nontender.
EXTREMITIES:  Lower extremities, 1+ edema, no calf tenderness.
 
LABORATORY DATA:  The patient's x-ray done today which I ordered shows 
significant worsening in infiltrates compared with 2 days ago.  His lab work in 
[employer] reviewed.  Note that his creatinine was initially 1.4 and he did 
receive some fluid resuscitation initially; however, subsequently did receive a 
dose of Lasix as well as plasma as well.
 
ASSESSMENT AND PLAN:
1.  Acute hypoxemic respiratory failure secondary to COVID-19.  He has sleep 
apnea and underlying continue BiPAP while asleep, settings adjusted.  Avoid 
sleeping supine.
2.  COVID-19.  Increase dexamethasone dose.  Continue with remdesivir.  Follow 
LFTs.  Recommend Actemra.  Unfortunately, not available.  Received 1 unit of 
convalescent plasma.
3.  Pulmonary infiltrates.  We will do a nasal swab for MRSA as well as sputum 
culture.  Increase ceftriaxone dose to 2 grams daily, switched azithromycin to 
doxycycline.
4.  Obstructive sleep apnea, see discussion above.
5.  Mild fluid overload.  We will try to run him on the drier side ordered 1 
dose of Lasix.  We will give him midodrine if needed.  Replace potassium.
6.  Edema/evaluation for thromboembolic phenomena.  I increased Lovenox to 
intermediate dose.  We will do venous Dopplers.  We will do an echo.  We do need
to run him on the drier side as above.  Therefore, he will be high risk for IV 
DYE, nephrotoxicity and therefore, for now, holding off on a CTA chest, D-dimer 
noted to be 1.07.
7.  Hyperglycemia, insulin sliding scale.
8.  Gastrointestinal prophylaxis, Protonix.
9.  Clostridium difficile prophylaxis, Lactinex.
 
 
 
[address]  [address]                    CONSULTATION                  
_______________________________________________________________________________
 
Name:       LAXMI CABRALES              Room:           [address] [place]  
.R.#:  [id number]      Account #:      [id number]  
Admission:  12/07/21     Attend Phys:    NEAL Huizar
Discharge:               Date of Birth:  [birth year]  
         Report #: [id number]
                                                                     [id number]
_______________________________________________________________________________
 
 
 
Thanks for this consultation.
 
 
 
 
 
 
 
 
 
 
 
 
 
 
 
 
 
 
 
 
 
 
 
 
 
 
 
 
 
 
 
 
 
 
 
 
 
 
 
 
<ELECTRONICALLY SIGNED>
                                        By:  Leonidas De Jesus MD              
12/13/21     1608
D: 12/09/21 1730_______________________________________
T: 12/09/21 2044Amamie De Jesus MD                 /nt

## 2021-12-13 NOTE — NUR
PT TITRATED FROM HEATED HIGH FLOW AT 60L/100% WITH 15L NRB OVER IT DOWN TO
HEATED HIGH FLOW 55L/100% WITHOUT NRB. PTS WIFE UPDATED X2.
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
REGARDING PT ISOLATION STATUS. STATED PT OUT OF ISO AFTER X10 DAYS.
PT SAT IN CHAIR MAJORITY OF DAY. USED INCENTIVE SPIROMETER X10/HR AS EDUCATED.

## 2021-12-14 VITALS — SYSTOLIC BLOOD PRESSURE: 98 MMHG | DIASTOLIC BLOOD PRESSURE: 48 MMHG

## 2021-12-14 VITALS — DIASTOLIC BLOOD PRESSURE: 55 MMHG | SYSTOLIC BLOOD PRESSURE: 108 MMHG

## 2021-12-14 VITALS — SYSTOLIC BLOOD PRESSURE: 118 MMHG | DIASTOLIC BLOOD PRESSURE: 67 MMHG

## 2021-12-14 VITALS — SYSTOLIC BLOOD PRESSURE: 116 MMHG | DIASTOLIC BLOOD PRESSURE: 70 MMHG

## 2021-12-14 VITALS — DIASTOLIC BLOOD PRESSURE: 63 MMHG | SYSTOLIC BLOOD PRESSURE: 112 MMHG

## 2021-12-14 VITALS — DIASTOLIC BLOOD PRESSURE: 57 MMHG | SYSTOLIC BLOOD PRESSURE: 115 MMHG

## 2021-12-14 LAB
ABSOLUTE BASOPHILS: 0 THOU/UL (ref 0–0.2)
ABSOLUTE EOSINOPHILS: 0 THOU/UL (ref 0–0.7)
ABSOLUTE MONOCYTES: 0.7 THOU/UL (ref 0–1.2)
ALBUMIN SERPL-MCNC: 2.6 G/DL (ref 3.4–5)
ALP SERPL-CCNC: 68 U/L (ref 46–116)
ALT SERPL-CCNC: 236 U/L (ref 30–65)
ANION GAP SERPL CALC-SCNC: 8 MMOL/L (ref 7–16)
AST SERPL-CCNC: 89 U/L (ref 15–37)
BASOPHILS NFR BLD AUTO: 0.3 %
BILIRUB SERPL-MCNC: 0.4 MG/DL
BUN SERPL-MCNC: 28 MG/DL (ref 7–18)
CALCIUM SERPL-MCNC: 8.5 MG/DL (ref 8.5–10.1)
CHLORIDE SERPL-SCNC: 103 MMOL/L (ref 98–107)
CO2 SERPL-SCNC: 26 MMOL/L (ref 21–32)
CREAT SERPL-MCNC: 1.3 MG/DL (ref 0.6–1.3)
EOSINOPHIL NFR BLD: 0 %
EST. AVERAGE GLUCOSE BLD GHB EST-MCNC: 183 MG/DL
GLUCOSE SERPL-MCNC: 223 MG/DL (ref 70–99)
GLYCOHEMOGLOBIN (HGB A1C): 8 % (ref 4.8–5.6)
GRANULOCYTES NFR BLD MANUAL: 89.3 %
HCT VFR BLD CALC: 41.1 % (ref 42–52)
HGB BLD-MCNC: 13.6 GM/DL (ref 14–18)
LYMPHOCYTES # BLD: 0.8 THOU/UL (ref 0.8–5.3)
LYMPHOCYTES NFR BLD AUTO: 5.3 %
MAGNESIUM SERPL-MCNC: 2.4 MG/DL (ref 1.8–2.4)
MCH RBC QN AUTO: 28.8 PG (ref 26–34)
MCHC RBC AUTO-ENTMCNC: 33 G/DL (ref 28–37)
MCV RBC: 87.3 FL (ref 80–100)
MONOCYTES NFR BLD: 5.1 %
MPV: 7.9 FL. (ref 7.2–11.1)
NEUTROPHILS # BLD: 12.9 THOU/UL (ref 1.6–8.1)
NUCLEATED RBCS: 0 /100WBC
PLATELET COUNT*: 471 THOU/UL (ref 150–400)
POTASSIUM SERPL-SCNC: 4.2 MMOL/L (ref 3.5–5.1)
PROT SERPL-MCNC: 6.7 G/DL (ref 6.4–8.2)
RBC # BLD AUTO: 4.71 MIL/UL (ref 4.5–6)
RDW-CV: 15 % (ref 10.5–14.5)
SODIUM SERPL-SCNC: 137 MMOL/L (ref 136–145)
WBC # BLD AUTO: 14.4 THOU/UL (ref 4–11)

## 2021-12-14 NOTE — NUR
PLAN OF CARE:
PT REMAINS TELE STATUS. PT CONTINUES TO HAVE INCREASED O2 NEEDS AND REMAINS ON
55L HF O2 AT THIS TIME. CM D/C PLANNING NEEDS TBD, BUT MAY INCLUDE HOME O2
PENDING R.T. REST AND EXERCISE PRIOR TO D/C. CM WILL REMAIN AVAILABELE TO
ASSIST AND FOLLOW AS NEEDED.

## 2021-12-14 NOTE — NUR
PT ALERT ORIENTED. UP AD MERI IN ROOM. O2 AT 55 LITERS HEATED HIGH FLOW 100%
VOIDS CLEAR YELLOW. DENIES PAIN.

## 2021-12-15 VITALS — DIASTOLIC BLOOD PRESSURE: 75 MMHG | SYSTOLIC BLOOD PRESSURE: 116 MMHG

## 2021-12-15 VITALS — SYSTOLIC BLOOD PRESSURE: 137 MMHG | DIASTOLIC BLOOD PRESSURE: 63 MMHG

## 2021-12-15 VITALS — SYSTOLIC BLOOD PRESSURE: 121 MMHG | DIASTOLIC BLOOD PRESSURE: 71 MMHG

## 2021-12-15 VITALS — SYSTOLIC BLOOD PRESSURE: 115 MMHG | DIASTOLIC BLOOD PRESSURE: 62 MMHG

## 2021-12-15 VITALS — SYSTOLIC BLOOD PRESSURE: 90 MMHG | DIASTOLIC BLOOD PRESSURE: 48 MMHG

## 2021-12-15 VITALS — SYSTOLIC BLOOD PRESSURE: 107 MMHG | DIASTOLIC BLOOD PRESSURE: 52 MMHG

## 2021-12-15 LAB
ABSOLUTE MONOCYTES: 0.1 THOU/UL (ref 0–1.2)
ALBUMIN SERPL-MCNC: 2.7 G/DL (ref 3.4–5)
ALP SERPL-CCNC: 76 U/L (ref 46–116)
ALT SERPL-CCNC: 232 U/L (ref 30–65)
ANION GAP SERPL CALC-SCNC: 6 MMOL/L (ref 7–16)
ANION GAP SERPL CALC-SCNC: 9 MMOL/L (ref 7–16)
AST SERPL-CCNC: 59 U/L (ref 15–37)
BILIRUB SERPL-MCNC: 0.4 MG/DL
BUN SERPL-MCNC: 29 MG/DL (ref 7–18)
BUN SERPL-MCNC: 30 MG/DL (ref 7–18)
CALCIUM SERPL-MCNC: 8.6 MG/DL (ref 8.5–10.1)
CALCIUM SERPL-MCNC: 8.7 MG/DL (ref 8.5–10.1)
CHLORIDE SERPL-SCNC: 102 MMOL/L (ref 98–107)
CHLORIDE SERPL-SCNC: 102 MMOL/L (ref 98–107)
CO2 SERPL-SCNC: 26 MMOL/L (ref 21–32)
CO2 SERPL-SCNC: 26 MMOL/L (ref 21–32)
CREAT SERPL-MCNC: 1.2 MG/DL (ref 0.6–1.3)
CREAT SERPL-MCNC: 1.3 MG/DL (ref 0.6–1.3)
GLUCOSE SERPL-MCNC: 211 MG/DL (ref 70–99)
GLUCOSE SERPL-MCNC: 224 MG/DL (ref 70–99)
GRANULOCYTES NFR BLD MANUAL: 86 %
HCT VFR BLD CALC: 42.3 % (ref 42–52)
HGB BLD-MCNC: 13.9 GM/DL (ref 14–18)
LYMPHOCYTES # BLD: 1.6 THOU/UL (ref 0.8–5.3)
LYMPHOCYTES NFR BLD AUTO: 11 %
MAGNESIUM SERPL-MCNC: 2.3 MG/DL (ref 1.8–2.4)
MAGNESIUM SERPL-MCNC: 2.4 MG/DL (ref 1.8–2.4)
MCH RBC QN AUTO: 28.7 PG (ref 26–34)
MCHC RBC AUTO-ENTMCNC: 32.9 G/DL (ref 28–37)
MCV RBC: 87.3 FL (ref 80–100)
MONOCYTES NFR BLD: 1 %
MPV: 7.9 FL. (ref 7.2–11.1)
NEUTROPHILS # BLD: 12.9 THOU/UL (ref 1.6–8.1)
NEUTS BAND NFR BLD: 2 %
NUCLEATED RBCS: 0 /100WBC
PLATELET # BLD EST: ADEQUATE 10*3/UL
PLATELET COUNT*: 441 THOU/UL (ref 150–400)
POTASSIUM SERPL-SCNC: 4.4 MMOL/L (ref 3.5–5.1)
POTASSIUM SERPL-SCNC: 5.2 MMOL/L (ref 3.5–5.1)
PROT SERPL-MCNC: 6.9 G/DL (ref 6.4–8.2)
RBC # BLD AUTO: 4.85 MIL/UL (ref 4.5–6)
RBC MORPH BLD: NORMAL
RDW-CV: 15.5 % (ref 10.5–14.5)
SODIUM SERPL-SCNC: 134 MMOL/L (ref 136–145)
SODIUM SERPL-SCNC: 137 MMOL/L (ref 136–145)
WBC # BLD AUTO: 14.7 THOU/UL (ref 4–11)

## 2021-12-15 NOTE — NUR
PLAN OF CARE:
PT'S O2 NEEDS DECREASED TO 6L O2 CURRENTLY. PT MAY STILL NEED HOME O2 AT D/C
PENDING R.T. REST AND EX OX PRIOR TO D/C WHEN PT MEDICALLY STABLE. NO OTHER
CM D/C PLANNING NEEDS IDENTIFIED. CM WILL REMAIN AVAILABLE TO ASSIST AND
FOLLOW AS NEEDED.

## 2021-12-15 NOTE — NUR
PT ALERT ORIENTED. UP AD MERI IN ROOM. O2 AT 7 LITERS HIGH FLOW NC. CARDIAC
MONITOR TRACING SB/SR. VOIDING SM AMTS CLEAR YELLOW. DENIES PAIN.

## 2021-12-16 VITALS — SYSTOLIC BLOOD PRESSURE: 128 MMHG | DIASTOLIC BLOOD PRESSURE: 76 MMHG

## 2021-12-16 VITALS — DIASTOLIC BLOOD PRESSURE: 61 MMHG | SYSTOLIC BLOOD PRESSURE: 100 MMHG

## 2021-12-16 VITALS — SYSTOLIC BLOOD PRESSURE: 113 MMHG | DIASTOLIC BLOOD PRESSURE: 62 MMHG

## 2021-12-16 VITALS — DIASTOLIC BLOOD PRESSURE: 69 MMHG | SYSTOLIC BLOOD PRESSURE: 114 MMHG

## 2021-12-16 VITALS — SYSTOLIC BLOOD PRESSURE: 100 MMHG | DIASTOLIC BLOOD PRESSURE: 61 MMHG

## 2021-12-16 LAB
ALBUMIN SERPL-MCNC: 2.6 G/DL (ref 3.4–5)
ALP SERPL-CCNC: 69 U/L (ref 46–116)
ALT SERPL-CCNC: 244 U/L (ref 30–65)
ANION GAP SERPL CALC-SCNC: 9 MMOL/L (ref 7–16)
AST SERPL-CCNC: 59 U/L (ref 15–37)
BILIRUB SERPL-MCNC: 0.4 MG/DL
BUN SERPL-MCNC: 26 MG/DL (ref 7–18)
CALCIUM SERPL-MCNC: 8.3 MG/DL (ref 8.5–10.1)
CHLORIDE SERPL-SCNC: 104 MMOL/L (ref 98–107)
CO2 SERPL-SCNC: 26 MMOL/L (ref 21–32)
CREAT SERPL-MCNC: 1.2 MG/DL (ref 0.6–1.3)
GLUCOSE SERPL-MCNC: 142 MG/DL (ref 70–99)
HCT VFR BLD CALC: 42.1 % (ref 42–52)
HGB BLD-MCNC: 13.8 GM/DL (ref 14–18)
MAGNESIUM SERPL-MCNC: 2.2 MG/DL (ref 1.8–2.4)
MCH RBC QN AUTO: 28.6 PG (ref 26–34)
MCHC RBC AUTO-ENTMCNC: 32.7 G/DL (ref 28–37)
MCV RBC: 87.5 FL (ref 80–100)
MPV: 7.6 FL. (ref 7.2–11.1)
PLATELET COUNT*: 428 THOU/UL (ref 150–400)
POTASSIUM SERPL-SCNC: 3.8 MMOL/L (ref 3.5–5.1)
PROT SERPL-MCNC: 6.6 G/DL (ref 6.4–8.2)
RBC # BLD AUTO: 4.82 MIL/UL (ref 4.5–6)
RDW-CV: 15.3 % (ref 10.5–14.5)
SODIUM SERPL-SCNC: 139 MMOL/L (ref 136–145)
WBC # BLD AUTO: 15 THOU/UL (ref 4–11)

## 2021-12-16 NOTE — NUR
ASSUMED PT CARE AT 0730. ASSESSMENT COMPLETED, VSS. MEDICATIONS ADMINISTERED
AS ORDERED.NEW ORDERS TO DISCHARGE TO HOME. PT'S SPOUSE HERE, DISCHARGE
INSTRUCTIONS REVIEWED WITH PT AND PT VERBALIZING UNDERSTANDING. ALL BELONGINGS
WITH PT. PT TRANSPORTED VIA  TO MAIN ENTRANCE TO LEAVE WITH WIFE AT APPROX
1330.

## 2021-12-16 NOTE — NUR
PLAN FOR THE PT TO D/C HOME TODAY WITH SELF-CARE AND HOME O2. HOME OXYGEN
ARRANGED WITH [employer]. R.T. INFORMS THAT PT NEEDS 4L 02 WITH
ACTIVITY. NO OTHER CM D/C PLANNING NEEDS ANTICIPATED. CM WILL REMAIN AVAILABLE
TO ASSIST AND FOLLOW AS NEEDED.
 
[employer]  PHONE: [phone]

## 2021-12-16 NOTE — NUR
PT WITHDRAWN ORIENTED. UP AD MERI IN ROOM. O2 AT 5 LITERS NC. VOIDS PER URINAL.
CARDIAC MONITOR TRACING SR.